# Patient Record
Sex: FEMALE | Race: WHITE | URBAN - METROPOLITAN AREA
[De-identification: names, ages, dates, MRNs, and addresses within clinical notes are randomized per-mention and may not be internally consistent; named-entity substitution may affect disease eponyms.]

---

## 2018-05-16 ENCOUNTER — TRANSFERRED RECORDS (OUTPATIENT)
Dept: HEALTH INFORMATION MANAGEMENT | Facility: CLINIC | Age: 68
End: 2018-05-16

## 2018-08-01 ENCOUNTER — TELEPHONE (OUTPATIENT)
Dept: OPHTHALMOLOGY | Facility: CLINIC | Age: 68
End: 2018-08-01

## 2018-08-01 DIAGNOSIS — H40.1190 POAG (PRIMARY OPEN-ANGLE GLAUCOMA): Primary | ICD-10-CM

## 2018-08-01 NOTE — TELEPHONE ENCOUNTER
Health Call Center    Phone Message    May a detailed message be left on voicemail: yes    Reason for Call: Other: Carol is requesting the W9 for billing reasons so they can cover cost of visit for patient.  Fax to [phone].  Or call with questions.       Action Taken: Message routed to:  [facility] ([facility]): lauri

## 2018-08-01 NOTE — TELEPHONE ENCOUNTER
They'd have to contact the [employer] Business Office to obtain a W9 form at [phone]      Message above per financial couselor    Left message with number for [place] business office   London Nielson RN 12:59 PM 08/01/18

## 2018-08-02 ENCOUNTER — OFFICE VISIT (OUTPATIENT)
Dept: OPHTHALMOLOGY | Facility: CLINIC | Age: 68
End: 2018-08-02
Attending: OPHTHALMOLOGY
Payer: COMMERCIAL

## 2018-08-02 DIAGNOSIS — H40.1190 POAG (PRIMARY OPEN-ANGLE GLAUCOMA): ICD-10-CM

## 2018-08-02 DIAGNOSIS — H40.1112 PRIMARY OPEN ANGLE GLAUCOMA OF RIGHT EYE, MODERATE STAGE: Primary | ICD-10-CM

## 2018-08-02 DIAGNOSIS — H40.002 BORDERLINE GLAUCOMA (GLAUCOMA SUSPECT), LEFT: ICD-10-CM

## 2018-08-02 PROCEDURE — G0463 HOSPITAL OUTPT CLINIC VISIT: HCPCS | Mod: ZF

## 2018-08-02 PROCEDURE — 92020 GONIOSCOPY: CPT | Mod: ZF | Performed by: OPHTHALMOLOGY

## 2018-08-02 PROCEDURE — 92083 EXTENDED VISUAL FIELD XM: CPT | Mod: ZF | Performed by: OPHTHALMOLOGY

## 2018-08-02 PROCEDURE — 92133 CPTRZD OPH DX IMG PST SGM ON: CPT | Mod: ZF | Performed by: OPHTHALMOLOGY

## 2018-08-02 PROCEDURE — 92015 DETERMINE REFRACTIVE STATE: CPT | Mod: GY,ZF

## 2018-08-02 RX ORDER — TRAVOPROST 0.04 MG/ML
1 SOLUTION/ DROPS OPHTHALMIC AT BEDTIME
Qty: 1 BOTTLE | Refills: 11 | Status: SHIPPED | OUTPATIENT
Start: 2018-08-02

## 2018-08-02 RX ORDER — TRAVOPROST 0.04 MG/ML
1 SOLUTION/ DROPS OPHTHALMIC AT BEDTIME
Qty: 1 BOTTLE | Refills: 11 | Status: SHIPPED | OUTPATIENT
Start: 2018-08-02 | End: 2018-08-02

## 2018-08-02 RX ORDER — CYANOCOBALAMIN 1000 UG/ML
INJECTION, SOLUTION INTRAMUSCULAR; SUBCUTANEOUS
Refills: 11 | COMMUNITY
Start: 2018-07-09

## 2018-08-02 RX ORDER — SUCRALFATE 1 G/10ML
SUSPENSION ORAL
Refills: 0 | COMMUNITY
Start: 2018-07-09

## 2018-08-02 RX ORDER — CHOLECALCIFEROL (VITAMIN D3) 25 MCG
TABLET ORAL
Refills: 11 | COMMUNITY
Start: 2018-05-08

## 2018-08-02 ASSESSMENT — CONF VISUAL FIELD
OS_INFERIOR_TEMPORAL_RESTRICTION: 3
OD_INFERIOR_NASAL_RESTRICTION: 3

## 2018-08-02 ASSESSMENT — PACHYMETRY
OD_CT(UM): 636
OS_CT(UM): 658

## 2018-08-02 ASSESSMENT — TONOMETRY
OD_IOP_MMHG: 16
OS_IOP_MMHG: 15
IOP_METHOD: APPLANATION

## 2018-08-02 ASSESSMENT — REFRACTION_MANIFEST
OS_AXIS: 178
OS_CYLINDER: +1.00
OS_SPHERE: -1.25
OD_AXIS: 175
OD_CYLINDER: +1.50
OS_ADD: +2.75
OD_SPHERE: -2.00
OD_ADD: +2.75

## 2018-08-02 ASSESSMENT — GONIOSCOPY: OD_INFERIOR: GRADE 3 WITH MILD PIGMENT

## 2018-08-02 ASSESSMENT — VISUAL ACUITY
OS_SC+: -1
OD_PH_SC: 20/30
METHOD: SNELLEN - LINEAR
OS_PH_SC: 20/25-1
OD_SC: 20/70
OS_SC: 20/40

## 2018-08-02 ASSESSMENT — SLIT LAMP EXAM - LIDS
COMMENTS: NORMAL
COMMENTS: NORMAL

## 2018-08-02 NOTE — MR AVS SNAPSHOT
After Visit Summary   8/2/2018    Etienne Malcolm    MRN: [id number]           Patient Information     Date Of Birth          1950        Visit Information        Provider Department      8/2/2018 8:30 AM [facility]        Today's Diagnoses     Primary open angle glaucoma of right eye, moderate stage    -  1    POAG (primary open-angle glaucoma)        Borderline glaucoma (glaucoma suspect), left           Follow-ups after your visit        Who to contact     Please call your clinic at [phone] to:    Ask questions about your health    Make or cancel appointments    Discuss your medicines    Learn about your test results    Speak to your doctor            Additional Information About Your Visit        Care EveryWhere ID     This is your Care EveryWhere ID. This could be used by other organizations to access your [facility] medical records  [id number]         Blood Pressure from Last 3 Encounters:   No data found for BP    Weight from Last 3 Encounters:   No data found for Wt              We Performed the Following     GONIOSCOPY     Low Vision Central OU     OCT Optic Nerve RNFL Spectralis OU (both eyes)     Pachymetry OU (both eyes)          Today's Medication Changes          These changes are accurate as of 8/2/18 11:20 AM.  If you have any questions, ask your nurse or doctor.               Start taking these medicines.        Dose/Directions    TRAVATAN Z 0.004 % ophthalmic solution   Used for:  Primary open angle glaucoma of right eye, moderate stage, Borderline glaucoma (glaucoma suspect), left   Generic drug:  travoprost (CHITO Free)   Started by:  Claudia Shepard MD        Dose:  1 drop   Place 1 drop into both eyes At Bedtime   Quantity:  1 Bottle   Refills:  11            Where to get your medicines      These medications were sent to [facility] [address]  [address], [facility] [address]     Phone:  [phone]     TRAVATAN Z  0.004 % ophthalmic solution                Primary Care Provider    None Specified       No primary provider on file.        Equal Access to Services     [facility] : Hadii aad ku hadwilmagerry Lyn, wadaphneda ninoska, roseanneraul morrisdiamondansley suazo, leroy cannonramakrishnajanuary ray. So [facility] [phone].    ATENCIÓN: Si habla español, tiene a crain disposición servicios gratuitos de asistencia lingüística. Llame al [phone].    We comply with applicable federal civil rights laws and [place] laws. We do not discriminate on the basis of race, color, national origin, age, disability, sex, sexual orientation, or gender identity.            Thank you!     Thank you for choosing [facility]  for your care. Our goal is always to provide you with excellent care. Hearing back from our patients is one way we can continue to improve our services. Please take a few minutes to complete the written survey that you may receive in the mail after your visit with us. Thank you!             Your Updated Medication List - Protect others around you: Learn how to safely use, store and throw away your medicines at www.disposemymeds.org.          This list is accurate as of 8/2/18 11:20 AM.  Always use your most recent med list.                   Brand Name Dispense Instructions for use Diagnosis    calcium-vitamin D 600-125 MG-UNIT Tabs           CARAFATE 1 GM/10ML suspension   Generic drug:  sucralfate           cholecalciferol 1000 UNIT tablet    vitamin D3          cyanocobalamin 1000 MCG/ML injection    VITAMIN B12          DEXILANT 30 MG Cpdr CR capsule   Generic drug:  dexlansoprazole      daily        TRAVATAN Z 0.004 % ophthalmic solution   Generic drug:  travoprost (CHITO Free)     1 Bottle    Place 1 drop into both eyes At Bedtime    Primary open angle glaucoma of right eye, moderate stage, Borderline glaucoma (glaucoma suspect), left

## 2018-08-02 NOTE — PROGRESS NOTES
CC: Referred by Dr Painter for moderate to severe glaucoma and poor adherence to treatment    HPI: History of inability to take a reliable go visual field (HVF).  Notes near vision not as good as before, wears over the counter readers.  Distance vision stable.    PAST OCULAR HISTORY:  No previous injuries or surgery  Started treatment for glaucoma ~ 2017    MAXIMUM INTRAOCULAR PRESSURE:  unknown    MEDICATIONS:  Has been prescribed drops but has not taken them due to irritation, etc.  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    TESTING TODAY:  Octopus visual field LVC (size V)   Right eye reliable, nasal step   Left eye reliable, normal    OCT retinal nerve fiber layer    Right eye inferior thinning   Left eye normal    EXAM:  Normal intraocular pressure on no medications today  Thick corneas   Glaucomatous cupping right eye  Minimal to mild pigment in TM    IMPRESSION:  Primary open angle glaucoma (POAG) right eye   Glaucoma suspect left eye   Patient does not know intraocular pressure in past    PLAN:  Consider preservative free glaucoma drops, like Zioptan (may not be covered) or Travatan Z   Travatan Z prescribed   Get intraocular pressure check with Dr Painter in 1 month   May be able to get formulary exclusion for Zioptan  Could try Selective laser trabeculoplasty (SLT) right eye although minimal pigment  Discussed trabeculectomy or tube-might do better with tube if taking post op drops is a problem    Attending Physician Attestation:  Complete documentation of historical and exam elements from today's encounter can be found in the full encounter summary report (not reduplicated in this progress note). I personally obtained the chief complaint(s) and history of present illness. I confirmed and edited asnecessary the review of  systems, past medical/surgical history, family history, social history, and examination findings as documented by others; and I examined the patient myself. I personally reviewed the relevant tests, images, and reports as documented above. I formulated and edited as necessary the assessment and plan and discussed the findings and management plan with the patient and family.  - Claudia Shepard MD 11:16 AM 8/2/2018

## 2018-08-02 NOTE — NURSING NOTE
Chief Complaints and History of Present Illnesses   Patient presents with     Glaucoma Evaluation     HPI    Affected eye(s):  Both   Symptoms:     Floaters   No flashes   No redness   No tearing   No Dryness   No itching         Do you have eye pain now?:  No      Comments:  Pt states vision has been stable over the past 6 months. Pt feeling eye pressure in both eyes that comes and goes.  Pt was prescribed eye drops many months ago but hd not taken them because they irritate and burn her eyes. Last use was January per pt.    Jennifer ARTEAGA August 2, 2018 8:59 AM

## 2018-08-02 NOTE — LETTER
2018      London Painter MD  [facility]  [address] [place] [address]  Fax: [phone]      RE: HOMERO MALCOLM  : 1950      Dear Dr. Painter:    I had the pleasure of seeing Homero Malcolm on 2018 at the [facility].  My exam findings are as follows:      Visual Acuity (Snellen - Linear)      Right Left   Dist sc 20/70 20/40 -1   Dist ph sc 20/30 20/25-1            Tonometry (Applanation, 9:18 AM)      Right Left   Pressure 16 15            Main Ophthalmology Exam     Slit Lamp Exam      Right Left    Lids/Lashes Normal Normal    Conjunctiva/Sclera White and quiet White and quiet    Cornea Clear Clear    Anterior Chamber Deep and quiet Deep and quiet    Iris Round and reactive Round and reactive    Lens 1+ Nuclear sclerosis cataract 1+ Nuclear sclerosis cataract      Fundus Exam      Right Left    Disc loss of temporal rim Tilted disc    C/D Ratio Vertical  0.5             History and Present Illness:  Referred by Dr. Painter for moderate to severe glaucoma and poor adherence to treatment.    History of inability to take a reliable go visual field (HVF).  Notes near vision not as good as before, wears over the counter readers.  Distance vision stable.    Past Ocular History:   No previous injuries or surgery  Started treatment for glaucoma ~ 2017    Maximum Intraocular Pressure: Unknown    Medications:   Has been prescribed drops but has not taken them due to irritation, etc.  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  [employer]  re-enactor    Testing Today:   Octopus visual field LVC (size V)   Right eye reliable, nasal step   Left eye reliable, normal    OCT retinal nerve fiber layer    Right eye inferior thinning   Left eye normal    Exam:   Normal intraocular pressure on no  medications today  Thick corneas   Glaucomatous cupping right eye  Minimal to mild pigment in TM    Impression:  Primary open angle glaucoma (POAG) right eye   Glaucoma suspect left eye   Patient does not know intraocular pressure in past          Plan:  Consider preservative free glaucoma drops, like Zioptan (may not be covered) or Travatan Z   Travatan Z prescribed   Get intraocular pressure check with Dr. Painter in 1 month   May be able to get formulary exclusion for Zioptan  Could try Selective laser trabeculoplasty (SLT) right eye although minimal pigment  Discussed trabeculectomy or tube-might do better with tube if taking post op drops is a problem      Sincerely,     Claudia Shepard MD  Glaucoma   [employer] Professor of Ophthalmology    Enclosed: Visual field & OCT